# Patient Record
Sex: FEMALE | Race: WHITE | NOT HISPANIC OR LATINO | Employment: FULL TIME | URBAN - METROPOLITAN AREA
[De-identification: names, ages, dates, MRNs, and addresses within clinical notes are randomized per-mention and may not be internally consistent; named-entity substitution may affect disease eponyms.]

---

## 2018-01-15 ENCOUNTER — ALLSCRIPTS OFFICE VISIT (OUTPATIENT)
Dept: OTHER | Facility: OTHER | Age: 53
End: 2018-01-15

## 2018-01-15 DIAGNOSIS — Z12.31 ENCOUNTER FOR SCREENING MAMMOGRAM FOR MALIGNANT NEOPLASM OF BREAST: ICD-10-CM

## 2018-01-16 NOTE — PROGRESS NOTES
Assessment   1  Encounter for preventive health examination (V70 0) (Z00 00)   2  Hypothyroid (244 9) (E03 9)   3  Depression (311) (F32 9)   4  Former smoker (V15 82) (R84 369)   5  BMI 30 0-30 9,adult (V85 30) (Z68 30)    Plan    BMI 30 0-30 9,adult, PMH: History of herpes zoster    · Begin a limited exercise program ; Status:Complete;   Done: 49NRB8319   · Some eating tips that can help you lose weight ; Status:Complete;   Done: 42EME6056  Depression    · Sertraline HCl - 50 MG Oral Tablet   · From  Sertraline HCl - 100 MG Oral Tablet TAKE 1 TABLET DAILY To Sertraline    HCl - 100 MG Oral Tablet TAKE 1 AND 1/2 TABLETS EVERY DAY  Encounter for screening colonoscopy    · COLONOSCOPY; Status:Active; Requested ARK:[id number]; Health Maintenance    · Brush your teeth {freq1} and floss at least once a day ; Status:Complete;   Done:    89KFA3406   · Drink plenty of fluids ; Status:Complete;   Done: 39QJR8673   · Eat foods that are high in calcium ; Status:Complete;   Done: 36XYK6722   · We encourage all of our patients to exercise regularly  30 minutes of exercise or physical    activity five or more days a week is recommended for children and adults ;    Status:Complete;   Done: 82YTN7829   · We recommend routine visits to a dentist ; Status:Complete;   Done: 98CAG7183   · We recommend that you follow the [address] ; Status:Complete;   Done:    50LDV7784   · Call [phone] if: You find a new or different kind of lump in your breast ;    Status:Complete;   Done: 00GMA4795   · Call [phone] if: You have any bleeding from the vagina ; Status:Complete;   Done:    95YWM4340   · Call [phone] if: You have any warning signs of skin cancer ; Status:Complete;      Done: 89NYY0478   · Call 911 if: You experience a new kind of chest pain (angina) or pressure ;    Status:Complete;   Done: 55RIC4762  Hypothyroid    · Levothyroxine Sodium 50 MCG Oral Tablet (Synthroid);  Take 1 tablet daily  Screening for depression    · *VB-Depression Screening; Status:Complete;   Done: 83SXO1002 02:18PM   · *VB-Depression Screening ; every 1 year; Last 63DAW8013; Next [id number];    Status:Active      * MAMMO SCREENING BILATERAL W CAD; Status:Hold For - Scheduling; Requested for:15Jan2018; Perform:[facility]; IHU:[id number];PGSNLME;       Ezequiel Edwards for screening for malignant neoplasm of breast; Ordered By:Sd Rojas;        Discussion/Summary   Discussion Summary:    1) SCHEDULE COLONOSCOPY: ROUTINE SCREEN COLON CANCER SCHEDULE MAMMOGRAM : ROUTINE SCREE FOR BREAST CANCER DEPRESSION/ANXIETY: CONTINUE SERTRALINE 150MG DAILY  HYPOTHRYOID: CONTINUE LEVOTHYROXINE  VITAMIN D DEFICIENCY:  CHECK ON LAST Tdap  schedule with general surgeon for evaluation of cyst on back  Medication SE Review and Pt Understands Tx: Possible side effects of new medications were reviewed with the patient/guardian today  The treatment plan was reviewed with the patient/guardian  The patient/guardian understands and agrees with the treatment plan      Chief Complaint   Chief Complaint Free Text Note Form: PT HERE FOR HER INITIAL VISIT FOR A YEARL PE  History of Present Illness   HPI: PATIENT IS HERE FOR HER INITIAL VISIT IN OUR OFFICE  SHE MOVED FROM [place] IN OCTOBER  SHE IS GENERALLY HEALTHY  Luis Goody PATIENT HAS PROBLEM WITH ANXIETY WHICH ZOLOFT IS HELPFUL FOR  SHE TAKES 150 MG DAILY  SHE HAS ALSO DIAGNOSIS OF HYPOTHYROIDISM AND IS ON THYROID SUPPLEMENT 50 MCG DAILY  THIS IS BEEN OVER THE PAST 6 MONTHS AND HER DOSAGE WAS ADJUSTED AND LEVEL WITH HER LAST BLOOD WORK  PATIENT IS UNSURE OF HER LAST TETANUS      Review of Systems   Complete-Female:      Constitutional: No fever, no chills, feels well, no tiredness, no recent weight gain or weight loss  Eyes: No complaints of eye pain, no red eyes, no eyesight problems, no discharge, no dry eyes, no itching of eyes        ENT: no complaints of earache, no loss of hearing, no nose bleeds, no nasal discharge, no sore throat, no hoarseness  Cardiovascular: No complaints of slow heart rate, no fast heart rate, no chest pain, no palpitations, no leg claudication, no lower extremity edema  Respiratory: No complaints of shortness of breath, no wheezing, no cough, no SOB on exertion, no orthopnea, no PND  Gastrointestinal: No complaints of abdominal pain, no constipation, no nausea or vomiting, no diarrhea, no bloody stools  Genitourinary: No complaints of dysuria, no incontinence, no pelvic pain, no dysmenorrhea, no vaginal discharge or bleeding  Musculoskeletal: No complaints of arthralgias, no myalgias, no joint swelling or stiffness, no limb pain or swelling  Integumentary: No complaints of skin rash or lesions, no itching, no skin wounds, no breast pain or lump  Neurological: No complaints of headache, no confusion, no convulsions, no numbness, no dizziness or fainting, no tingling, no limb weakness, no difficulty walking  Psychiatric: anxiety, but-- as noted in HPI,-- not suicidal,-- no personality change,-- no sleep disturbances-- and-- no depression  Endocrine: No complaints of proptosis, no hot flashes, no muscle weakness, no deepening of the voice, no feelings of weakness  Hematologic/Lymphatic: No complaints of swollen glands, no swollen glands in the neck, does not bleed easily, does not bruise easily  Active Problems   1  Depression (311) (F32 9)   2  Encounter for screening colonoscopy (V76 51) (Z12 11)   3  Encounter for screening for malignant neoplasm of breast (V76 10) (Z12 31)   4  Hypothyroid (244 9) (E03 9)   5  Screening for depression (V79 0) (Z13 89)    Past Medical History   1  History of Fracture of middle phalanx of finger of left hand (816 01) (A35 337P)   2  History of herpes zoster (V12 09) (Z86 19)  Active Problems And Past Medical History Reviewed:     The active problems and past medical history were reviewed and updated today       Surgical History   1  History of Adenoidectomy   2  History of Lymphadenectomy  Surgical History Reviewed: The surgical history was reviewed and updated today  Family History   Mother    1  Family history of osteoporosis (V17 81) (Z82 62)   2  Family history of thyroid disease (V18 19) (Z83 49)   3  Family history of Restless leg  Father    4  Family history of malignant neoplasm of prostate (V16 42) (Z80 42)  Brother    5  Family history of thyroid disease (V18 19) (Z83 49)  Paternal Grandmother    10  Family history of Alzheimer's disease (V17 2) (Z82 0)  Paternal Relatives    7  Family history of schizophrenia (V17 0) (Z81 8)  Family History Reviewed: The family history was reviewed and updated today  Social History    · Alcohol ingestion, 1-4 drinks/week (V69 8) (Z78 9)   · Daily caffeine consumption, 1 serving a day   · Exercises daily (V49 89) (Z78 9)   · Former smoker (H62 93) (V11 881)  Social History Reviewed: The social history was reviewed and updated today  The social history was reviewed and is unchanged  Current Meds    1  Sertraline HCl - 100 MG Oral Tablet; Therapy: (Recorded:15Jan2018) to Recorded   2  Sertraline HCl - 50 MG Oral Tablet; Therapy: (Recorded:15Jan2018) to Recorded   3  Synthroid 50 MCG Oral Tablet; Therapy: (Recorded:15Jan2018) to Recorded   4  Vitamin D3 5000 UNIT Oral Capsule; Therapy: (Recorded:15Jan2018) to Recorded    Allergies   1  Sulfa Drugs    Vitals   Vital Signs    Recorded: 62XMW4937 02:08PM   Temperature 97 6 F   Heart Rate 66   Systolic 148   Diastolic 78   Height 5 ft 7 in   Weight 195 lb 12 oz   BMI Calculated 30 66   BSA Calculated 2   O2 Saturation 98     Physical Exam        Constitutional      General appearance: No acute distress, well appearing and well nourished  Eyes      Conjunctiva and lids: No swelling, erythema or discharge  Pupils and irises: Equal, round and reactive to light         Ears, Nose, Mouth, and Throat      External inspection of ears and nose: Normal        Otoscopic examination: Tympanic membranes translucent with normal light reflex  Canals patent without erythema  Nasal mucosa, septum, and turbinates: Normal without edema or erythema  Oropharynx: Normal with no erythema, edema, exudate or lesions  Pulmonary      Respiratory effort: No increased work of breathing or signs of respiratory distress  Auscultation of lungs: Clear to auscultation  Cardiovascular      Auscultation of heart: Normal rate and rhythm, normal S1 and S2, without murmurs  Examination of extremities for edema and/or varicosities: Normal        Abdomen      Abdomen: Non-tender, no masses  Liver and spleen: No hepatomegaly or splenomegaly  Skin      Skin and subcutaneous tissue: Normal without rashes or lesions  Psychiatric      Orientation to person, place, and time: Normal        Mood and affect: Normal           Results/Data   *VB-Depression Screening 23ZRZ3058 02:18PM [employer]      Test Name Result Flag Reference   Depression Scale Result      Depression Screen - Negative For Symptoms      PHQ-2 Adult Depression Screening 13QEU1676 02:17PM User, [facility]      Test Name Result Flag Reference   PHQ-2 Adult Depression Score 0     Over the last two weeks, how often have you been bothered by any of the following problems?      Little interest or pleasure in doing things: Not at all - 0     Feeling down, depressed, or hopeless: Not at all - 0   PHQ-2 Adult Depression Screening Negative          Signatures    Electronically signed by : Corinne Crowley DO; Mac 15 2018 10:54PM EST                       (Author)

## 2018-01-22 VITALS
DIASTOLIC BLOOD PRESSURE: 78 MMHG | WEIGHT: 195.75 LBS | SYSTOLIC BLOOD PRESSURE: 124 MMHG | TEMPERATURE: 97.6 F | OXYGEN SATURATION: 98 % | HEART RATE: 66 BPM | BODY MASS INDEX: 30.72 KG/M2 | HEIGHT: 67 IN

## 2018-01-25 ENCOUNTER — HOSPITAL ENCOUNTER (OUTPATIENT)
Dept: MAMMOGRAPHY | Facility: CLINIC | Age: 53
Discharge: HOME | End: 2018-01-25
Payer: COMMERCIAL

## 2018-01-25 DIAGNOSIS — Z12.31 ENCOUNTER FOR SCREENING MAMMOGRAM FOR MALIGNANT NEOPLASM OF BREAST: ICD-10-CM

## 2018-01-25 PROCEDURE — 77067 SCR MAMMO BI INCL CAD: CPT

## 2018-02-14 DIAGNOSIS — E03.9 ACQUIRED HYPOTHYROIDISM: Primary | ICD-10-CM

## 2018-02-14 DIAGNOSIS — F32.0 MILD MAJOR DEPRESSION (HCC): ICD-10-CM

## 2018-02-14 RX ORDER — SERTRALINE HYDROCHLORIDE 100 MG/1
1.5 TABLET, FILM COATED ORAL DAILY
COMMUNITY
End: 2018-02-14 | Stop reason: SDUPTHER

## 2018-02-14 RX ORDER — LEVOTHYROXINE SODIUM 0.05 MG/1
1 TABLET ORAL DAILY
COMMUNITY
End: 2018-02-14 | Stop reason: SDUPTHER

## 2018-02-15 PROBLEM — F32.A DEPRESSION: Status: ACTIVE | Noted: 2018-01-15

## 2018-02-15 PROBLEM — E03.9 HYPOTHYROID: Status: ACTIVE | Noted: 2018-01-15

## 2018-02-15 RX ORDER — SERTRALINE HYDROCHLORIDE 100 MG/1
150 TABLET, FILM COATED ORAL DAILY
Qty: 1.5 TABLET | Refills: 0 | Status: SHIPPED | OUTPATIENT
Start: 2018-02-15 | End: 2018-02-19 | Stop reason: SDUPTHER

## 2018-02-15 RX ORDER — LEVOTHYROXINE SODIUM 0.05 MG/1
50 TABLET ORAL DAILY
Qty: 30 TABLET | Refills: 2 | Status: SHIPPED | OUTPATIENT
Start: 2018-02-15 | End: 2018-05-23 | Stop reason: DRUGHIGH

## 2018-02-15 RX ORDER — MAG HYDROX/ALUMINUM HYD/SIMETH 400-400-40
SUSPENSION, ORAL (FINAL DOSE FORM) ORAL
COMMUNITY
End: 2019-01-04 | Stop reason: ALTCHOICE

## 2018-02-19 DIAGNOSIS — F32.0 MILD MAJOR DEPRESSION (HCC): ICD-10-CM

## 2018-02-19 RX ORDER — SERTRALINE HYDROCHLORIDE 100 MG/1
150 TABLET, FILM COATED ORAL DAILY
Qty: 135 TABLET | Refills: 3 | Status: SHIPPED | OUTPATIENT
Start: 2018-02-19 | End: 2018-02-23 | Stop reason: SDUPTHER

## 2018-02-19 RX ORDER — SERTRALINE HYDROCHLORIDE 100 MG/1
150 TABLET, FILM COATED ORAL DAILY
Qty: 135 TABLET | Refills: 3 | Status: SHIPPED | OUTPATIENT
Start: 2018-02-19 | End: 2018-02-19 | Stop reason: SDUPTHER

## 2018-02-23 DIAGNOSIS — F32.0 MILD MAJOR DEPRESSION (HCC): ICD-10-CM

## 2018-02-23 RX ORDER — SERTRALINE HYDROCHLORIDE 100 MG/1
150 TABLET, FILM COATED ORAL DAILY
Qty: 135 TABLET | Refills: 0 | Status: SHIPPED | OUTPATIENT
Start: 2018-02-23 | End: 2019-05-17 | Stop reason: SDUPTHER

## 2018-04-11 ENCOUNTER — TELEPHONE (OUTPATIENT)
Dept: GASTROENTEROLOGY | Facility: CLINIC | Age: 53
End: 2018-04-11

## 2018-04-11 NOTE — TELEPHONE ENCOUNTER
Zeina Covington  1965  [address]  Cell Phone     Screened by: Mel Alcantara      Referring Dr : Dr Flaco Valdez:   Has patient been referred for a routine screening Colonoscopy? yes  Is the patient over 48years of age? yes    If the answer is YES to both questions, proceed to the medical questions  Do you have any of the following symptoms? Have you had a coronary or vascular stent within the last year? no    Have you had a heart attack or stroke in the last 6 months? no    Have you had intestinal surgery in the last 3 months? no    Do you have problems with:    Do you use:  Oxygen no  CPAP/BiPAP no    Have you been hospitalized in the last Month? no    Have you been diagnosed with a bleeding disorder or anemia? no    Have you had chest pain (angina) or breathing problems  (COPD) in the last 3 months? no     Do you have any difficulty walking up a flight of stairs? no    Have you had Kidney failure or insufficiency? no    Have you had heart valve surgery? no    Are you confined to a wheelchair? no    Do you take     Do you take insulin for Diabetes no  Name of medication:    : If patient answers NO to medical questions, then schedule procedure  If patient answers YES to medical questions, then schedule office appointment  Previous Colonoscopy no   (if yes) Date and Facility of last colonoscopy? Patient scheduled for procedure:   Scheduled by:     Time:   Provider:   Location:     Insurance:   Referral Required? Were instructions Mailed? Were instructions sent to [facility]:   Was the prep sent to Pharmacy?      Comments: any provider, eliazar

## 2018-04-20 ENCOUNTER — PREP FOR PROCEDURE (OUTPATIENT)
Dept: GASTROENTEROLOGY | Facility: AMBULARY SURGERY CENTER | Age: 53
End: 2018-04-20

## 2018-04-20 DIAGNOSIS — Z12.11 SCREENING FOR COLON CANCER: Primary | ICD-10-CM

## 2018-05-08 ENCOUNTER — ANESTHESIA EVENT (OUTPATIENT)
Dept: PERIOP | Facility: AMBULARY SURGERY CENTER | Age: 53
End: 2018-05-08
Payer: COMMERCIAL

## 2018-05-15 ENCOUNTER — OFFICE VISIT (OUTPATIENT)
Dept: FAMILY MEDICINE CLINIC | Facility: CLINIC | Age: 53
End: 2018-05-15
Payer: COMMERCIAL

## 2018-05-15 VITALS
HEIGHT: 72 IN | WEIGHT: 195 LBS | BODY MASS INDEX: 26.41 KG/M2 | TEMPERATURE: 98.4 F | DIASTOLIC BLOOD PRESSURE: 64 MMHG | HEART RATE: 61 BPM | OXYGEN SATURATION: 97 % | SYSTOLIC BLOOD PRESSURE: 100 MMHG

## 2018-05-15 DIAGNOSIS — E03.9 ACQUIRED HYPOTHYROIDISM: Primary | ICD-10-CM

## 2018-05-15 DIAGNOSIS — R53.83 OTHER FATIGUE: ICD-10-CM

## 2018-05-15 PROCEDURE — 99213 OFFICE O/P EST LOW 20 MIN: CPT | Performed by: FAMILY MEDICINE

## 2018-05-15 PROCEDURE — 3008F BODY MASS INDEX DOCD: CPT | Performed by: FAMILY MEDICINE

## 2018-05-15 NOTE — PROGRESS NOTES
Assessment/Plan:      Diagnoses and all orders for this visit:    Acquired hypothyroidism  -     TSH, 3rd generation; Future  -     T3, free; Future  -     T4, free; Future  -     TSH, 3rd generation  -     T3, free  -     T4, free    Other fatigue  -     TSH, 3rd generation; Future  -     T3, free; Future  -     T4, free; Future  -     TSH, 3rd generation  -     T3, free  -     T4, free        Fatigue:  Patient will get thyroid testing done and may need change in dosage of thyroid medication  , currently on 50 mcg daily  Subjective:     Patient ID: Adeola Greer is a 47 y o  female  Patient has been feeling tired for the past 3-4 weeks  She is currently on levothyroxine 50 mcg daily  She does take in the morning on an empty stomach with a glass of water and does not eat for 30 min after  She has been working out on the Treadmill 6 days a week, for about 3 miles  Eating healthy  Some shortness of breath, dizzy spells  Feels fatigued constantly  Patient getting adequate sleep at about 6-7 hours per night  She feels that is a restful sleep but wakes up with fatigue  She denies any chest pains or shortness of breath  She denies tick bites or rashes  Review of Systems   Constitutional: Positive for fatigue  Negative for fever  Trouble losing weight   HENT: Negative  Eyes: Negative  Respiratory: Negative  Negative for cough  Cardiovascular: Negative  Gastrointestinal: Negative  Endocrine: Negative  Genitourinary: Negative  Musculoskeletal: Negative  Skin: Negative  Allergic/Immunologic: Negative  Neurological: Negative  Psychiatric/Behavioral: Negative            The following portions of the patient's history were reviewed and updated as appropriate: allergies, current medications, past family history, past medical history, past social history, past surgical history and problem list     Objective:  Vitals:    05/15/18 1730   BP: 100/64   Pulse: 61   Temp: 98 4 °F (36 9 °C)   SpO2: 97%   Weight: 88 5 kg (195 lb)   Height: 7' 7 5" (2 324 m)      Physical Exam   Constitutional: She is oriented to person, place, and time  She appears well-developed and well-nourished  HENT:   Head: Normocephalic and atraumatic  Cardiovascular: Normal rate, regular rhythm and normal heart sounds  Pulmonary/Chest: Effort normal and breath sounds normal    Abdominal: Soft  Bowel sounds are normal    Neurological: She is alert and oriented to person, place, and time  Skin: Skin is warm and dry  Psychiatric: She has a normal mood and affect  Her behavior is normal  Judgment and thought content normal    Nursing note and vitals reviewed

## 2018-05-16 NOTE — PATIENT INSTRUCTIONS
Non fasting blood work at [employer] to check for thyroid levels  Continue with activity  Continue levothyroxine at 50 mcg daily  , may need adjustment in medication

## 2018-05-21 ENCOUNTER — HOSPITAL ENCOUNTER (OUTPATIENT)
Facility: AMBULARY SURGERY CENTER | Age: 53
Setting detail: OUTPATIENT SURGERY
Discharge: HOME | End: 2018-05-21
Attending: INTERNAL MEDICINE | Admitting: INTERNAL MEDICINE
Payer: COMMERCIAL

## 2018-05-21 ENCOUNTER — ANESTHESIA (OUTPATIENT)
Dept: PERIOP | Facility: AMBULARY SURGERY CENTER | Age: 53
End: 2018-05-21
Payer: COMMERCIAL

## 2018-05-21 VITALS
BODY MASS INDEX: 29.66 KG/M2 | SYSTOLIC BLOOD PRESSURE: 110 MMHG | RESPIRATION RATE: 16 BRPM | HEIGHT: 67 IN | OXYGEN SATURATION: 99 % | HEART RATE: 48 BPM | DIASTOLIC BLOOD PRESSURE: 66 MMHG | WEIGHT: 189 LBS | TEMPERATURE: 97.2 F

## 2018-05-21 LAB — EXT PREGNANCY TEST URINE: NEGATIVE

## 2018-05-21 PROCEDURE — G0121 COLON CA SCRN NOT HI RSK IND: HCPCS | Performed by: INTERNAL MEDICINE

## 2018-05-21 PROCEDURE — 81025 URINE PREGNANCY TEST: CPT | Performed by: ANESTHESIOLOGY

## 2018-05-21 RX ORDER — LIDOCAINE HYDROCHLORIDE 10 MG/ML
INJECTION, SOLUTION INFILTRATION; PERINEURAL AS NEEDED
Status: DISCONTINUED | OUTPATIENT
Start: 2018-05-21 | End: 2018-05-21 | Stop reason: SURG

## 2018-05-21 RX ORDER — SODIUM CHLORIDE 9 MG/ML
50 INJECTION, SOLUTION INTRAVENOUS CONTINUOUS
Status: DISCONTINUED | OUTPATIENT
Start: 2018-05-21 | End: 2018-05-21 | Stop reason: HOSPADM

## 2018-05-21 RX ORDER — PROPOFOL 10 MG/ML
INJECTION, EMULSION INTRAVENOUS AS NEEDED
Status: DISCONTINUED | OUTPATIENT
Start: 2018-05-21 | End: 2018-05-21 | Stop reason: SURG

## 2018-05-21 RX ORDER — PROPOFOL 10 MG/ML
INJECTION, EMULSION INTRAVENOUS CONTINUOUS PRN
Status: DISCONTINUED | OUTPATIENT
Start: 2018-05-21 | End: 2018-05-21 | Stop reason: SURG

## 2018-05-21 RX ADMIN — PROPOFOL 120 MCG/KG/MIN: 10 INJECTION, EMULSION INTRAVENOUS at 11:42

## 2018-05-21 RX ADMIN — SODIUM CHLORIDE: 0.9 INJECTION, SOLUTION INTRAVENOUS at 11:36

## 2018-05-21 RX ADMIN — PROPOFOL 100 MG: 10 INJECTION, EMULSION INTRAVENOUS at 11:42

## 2018-05-21 RX ADMIN — LIDOCAINE HYDROCHLORIDE 50 MG: 10 INJECTION, SOLUTION INFILTRATION; PERINEURAL at 11:42

## 2018-05-21 NOTE — ANESTHESIA POSTPROCEDURE EVALUATION
Post-Op Assessment Note      CV Status:  Stable    Mental Status:  Alert and awake    Hydration Status:  Euvolemic    PONV Controlled:  Controlled    Airway Patency:  Patent    Post Op Vitals Reviewed: Yes          Staff: CRNA           BP   103/65   Temp     Pulse 52   Resp 18   SpO2   99

## 2018-05-21 NOTE — OP NOTE
COLONOSCOPY    PROCEDURE: Colonoscopy    INDICATIONS: Screening for Colon Cancer    POST-OP DIAGNOSIS: See the impression below    SEDATION: Monitored anesthesia care, check anesthesia records    PHYSICAL EXAM:    /65   Pulse (!) 48   Temp (!) 96 6 °F (35 9 °C) (Temporal)   Resp 16   Ht 5' 7" (1 702 m)   Wt 85 7 kg (189 lb)   LMP 05/21/2018 (Exact Date)   SpO2 99%   BMI 29 60 kg/m²   Body mass index is 29 6 kg/m²  General: NAD  Heart: S1 & S2 normal, RRR  Lungs: CTA, No rales or rhonchi  Abdomen: Soft, nontender, nondistended, good bowel sounds    CONSENT:  Informed consent was obtained for the procedure, including sedation after explaining the risks and benefits of the procedure  Risks including but not limited to bleeding, perforation, infection, aspiration were discussed in detail  Also explained about less than 100%$ sensitivity with the exam and other alternatives  PREPARATION:   EKG tracing, pulse oximetry, blood pressure were monitored throughout the procedure  Patient was identified by myself both verbally and by visual inspection of ID band  DESCRIPTION:   Patient was placed in the left lateral decubitus position and was sedated with the above medication  Digital rectal examination was performed  The colonoscope was introduced in to the anal canal and advanced up to cecum, which was identified by the appendiceal orifice and IC valve  A careful inspection was made as the colonoscope was withdrawn, including a retroflexed view of the rectum; findings and interventions are described below  Appropriate photodocumentation was obtained  The quality of the colonic preparation was adequate  FINDINGS:    1  Cecum and ileocecal valve-normal mucosa    2  The rest of the colon mucosa is normal         IMPRESSIONS:      As above    RECOMMENDATIONS:    Next colonoscopy in 10 years    COMPLICATIONS:  None; patient tolerated the procedure well      DISPOSITION: PACU           CONDITION: Stable

## 2018-05-21 NOTE — ANESTHESIA PREPROCEDURE EVALUATION
Review of Systems/Medical History    Chart reviewed  History of anesthetic complications PONV    Cardiovascular  Negative cardio ROS Exercise tolerance (METS): >4,     Pulmonary    Comment: Quit smoking 7 years ago     GI/Hepatic  Negative GI/hepatic ROS          Negative  ROS        Endo/Other  History of thyroid disease , hypothyroidism,      GYN      Comment: (-) HCG DOS     Hematology  Negative hematology ROS      Musculoskeletal  Negative musculoskeletal ROS        Neurology  Negative neurology ROS      Psychology   Depression ,              Physical Exam    Airway    Mallampati score: I  TM Distance: >3 FB  Neck ROM: full     Dental   No notable dental hx     Cardiovascular  Comment: Negative ROS,     Pulmonary      Other Findings        Anesthesia Plan  ASA Score- 2     Anesthesia Type- IV sedation with anesthesia with ASA Monitors  Additional Monitors:   Airway Plan:     Comment: GA backup  Plan Factors-    Induction- intravenous  Postoperative Plan-     Informed Consent- Anesthetic plan and risks discussed with patient  I personally reviewed this patient with the CRNA  Discussed and agreed on the Anesthesia Plan with the JM Martin

## 2018-05-21 NOTE — H&P
History and Physical - [facility]  Rafat Payne 47 y o  female MRN: [id number]        HPI:  60-year-old female was referred for screening colonoscopy  Patient has regular bowel movements and denies any blood or mucus in the stool  Appetite is good and denies any recent weight loss  Denies any abdominal pain, nausea, or vomiting  Has no heartburn or acid reflux  Denies any difficulty swallowing  Historical Information   No past medical history on file  Past Surgical History:   Procedure Laterality Date    ADENOIDECTOMY      Last assessed: 1/15/18    LYMPHADENECTOMY      Neck, Last assessed: 1/15/18     Social History   History   Alcohol Use    Yes     Comment: 1-4 drinks/ week, per allscripts     History   Drug use: Unknown     History   Smoking Status    Former Smoker   Smokeless Tobacco    Never Used     Family History   Problem Relation Age of Onset    Osteoporosis Mother     Thyroid disease Mother     Restless legs syndrome Mother     Prostate cancer Father     Thyroid disease Brother     Alzheimer's disease Paternal Grandmother     Schizophrenia Other        Meds/Allergies     Prescriptions Prior to Admission   Medication    Cholecalciferol (VITAMIN D3) 5000 units CAPS    levothyroxine 50 mcg tablet    sertraline (ZOLOFT) 100 mg tablet       Allergies   Allergen Reactions    Sulfa Antibiotics Throat Swelling       Objective     There were no vitals taken for this visit      PHYSICAL EXAM:    Gen: NAD  CV: S1 & S2 normal, RRR  CHEST: Clear to auscultate  ABD: soft, NT/ND, good bowel sounds  EXT: no edema    ASSESSMENT:     Screening for colon cancer    PLAN:    Colonoscopy

## 2018-05-22 LAB
T3FREE SERPL-MCNC: 1.9 PG/ML (ref 2–4.4)
T4 FREE SERPL-MCNC: 1.03 NG/DL (ref 0.82–1.77)
TSH SERPL DL<=0.005 MIU/L-ACNC: 3.64 UIU/ML (ref 0.45–4.5)

## 2018-05-23 DIAGNOSIS — E03.9 ACQUIRED HYPOTHYROIDISM: Primary | ICD-10-CM

## 2018-05-23 RX ORDER — LEVOTHYROXINE SODIUM 0.07 MG/1
75 TABLET ORAL DAILY
Qty: 30 TABLET | Refills: 1 | Status: SHIPPED | OUTPATIENT
Start: 2018-05-23 | End: 2018-06-19 | Stop reason: SDUPTHER

## 2018-06-19 DIAGNOSIS — E03.9 ACQUIRED HYPOTHYROIDISM: ICD-10-CM

## 2018-06-20 RX ORDER — LEVOTHYROXINE SODIUM 0.07 MG/1
75 TABLET ORAL DAILY
Qty: 90 TABLET | Refills: 0 | Status: SHIPPED | OUTPATIENT
Start: 2018-06-20 | End: 2019-01-04 | Stop reason: ALTCHOICE

## 2018-07-15 LAB
T3FREE SERPL-MCNC: 1.9 PG/ML (ref 2–4.4)
T4 FREE SERPL-MCNC: 1.16 NG/DL (ref 0.82–1.77)
TSH SERPL DL<=0.005 MIU/L-ACNC: 2.97 UIU/ML (ref 0.45–4.5)

## 2018-09-22 DIAGNOSIS — E03.9 ACQUIRED HYPOTHYROIDISM: ICD-10-CM

## 2018-09-22 RX ORDER — LEVOTHYROXINE SODIUM 0.07 MG/1
TABLET ORAL
Qty: 90 TABLET | Refills: 2 | Status: SHIPPED | OUTPATIENT
Start: 2018-09-22

## 2019-01-04 ENCOUNTER — OFFICE VISIT (OUTPATIENT)
Dept: FAMILY MEDICINE CLINIC | Facility: CLINIC | Age: 54
End: 2019-01-04
Payer: COMMERCIAL

## 2019-01-04 VITALS
OXYGEN SATURATION: 99 % | DIASTOLIC BLOOD PRESSURE: 78 MMHG | TEMPERATURE: 98.1 F | WEIGHT: 201.5 LBS | HEART RATE: 62 BPM | SYSTOLIC BLOOD PRESSURE: 122 MMHG | BODY MASS INDEX: 31.63 KG/M2 | HEIGHT: 67 IN

## 2019-01-04 DIAGNOSIS — Z12.39 SCREENING FOR BREAST CANCER: ICD-10-CM

## 2019-01-04 DIAGNOSIS — J01.00 ACUTE NON-RECURRENT MAXILLARY SINUSITIS: Primary | ICD-10-CM

## 2019-01-04 PROCEDURE — 99214 OFFICE O/P EST MOD 30 MIN: CPT | Performed by: FAMILY MEDICINE

## 2019-01-04 RX ORDER — DOXYCYCLINE HYCLATE 100 MG/1
100 CAPSULE ORAL EVERY 12 HOURS SCHEDULED
Qty: 20 CAPSULE | Refills: 0 | Status: SHIPPED | OUTPATIENT
Start: 2019-01-04 | End: 2019-01-14

## 2019-01-04 NOTE — PROGRESS NOTES
Subjective:   Chief Complaint   Patient presents with    URI     PT WAS SEEN ON 12/23 AT URGENT CARE FOR SINUS AND EAR INFECTION  PT FINISHED MEDICATION 3 DAYS AGO  PT CONTINUES WITH EAR PAIN, SINUS PRESSURE, HEADACHE, CONGESTION AND SCALP SENSITIVITY  Patient ID: Micky Santa is a 47 y o  female  The pt is here because SHE has been sick for 2 weeks now  She was seen in urgent care in [address] [place] [address] on 12/23/18  She was treated for a sinus and ear infection with amoxicillin 875mg twice a day for a week  It did not really help much   + sinus pain/pressure  + ear pain and pressure  + cough  + nasal congestion  + headaches  + PND  + sore throat  No fevers          The following portions of the patient's history were reviewed and updated as appropriate: allergies, current medications, past family history, past medical history, past social history, past surgical history and problem list     Review of Systems      Objective:  Vitals:    01/04/19 1342   BP: 122/78   Pulse: 62   Temp: 98 1 °F (36 7 °C)   SpO2: 99%   Weight: 91 4 kg (201 lb 8 oz)   Height: 5' 7" (1 702 m)      Physical Exam   Constitutional: She is oriented to person, place, and time  Vital signs are normal  She appears well-developed and well-nourished  She appears ill  HENT:   Head: Normocephalic and atraumatic  Right Ear: External ear normal  Tympanic membrane is erythematous and bulging  Left Ear: External ear normal  Tympanic membrane is erythematous and bulging  Nose: Mucosal edema present  No rhinorrhea or sinus tenderness  Right sinus exhibits maxillary sinus tenderness  Right sinus exhibits no frontal sinus tenderness  Left sinus exhibits maxillary sinus tenderness  Left sinus exhibits no frontal sinus tenderness  Mouth/Throat: Mucous membranes are normal  Posterior oropharyngeal erythema present  No oropharyngeal exudate, posterior oropharyngeal edema or tonsillar abscesses     Eyes: Conjunctivae and lids are normal    Pulmonary/Chest: Effort normal and breath sounds normal    Lymphadenopathy:     She has cervical adenopathy  Neurological: She is alert and oriented to person, place, and time  Skin: Skin is warm, dry and intact  Psychiatric: She has a normal mood and affect  Thought content normal          Assessment/Plan:    No problem-specific Assessment & Plan notes found for this encounter  Diagnoses and all orders for this visit:    Acute non-recurrent maxillary sinusitis  -     doxycycline hyclate (VIBRAMYCIN) 100 mg capsule; Take 1 capsule (100 mg total) by mouth every 12 (twelve) hours for 10 days    I suspect that the patient has a bacterial sinusitis and we discussed that amoxicillin often does not treat sinusitis and we discussed that amoxicillin does not treat sinusitis much of the time  I prescribed a different antibiotic and encouraged medication for sx relief  Rest and fluids encouraged as well  If she is not feeling better within 4-5 days she should let me know and I will add prednisone      Screening for breast cancer  -     Mammo screening bilateral w 3d & cad; Future

## 2019-01-04 NOTE — PATIENT INSTRUCTIONS
Rhinosinusitis   WHAT YOU NEED TO KNOW:   Rhinosinusitis (RS) is inflammation of your nose and sinuses  It commonly begins as a virus, often as a common cold  Viruses usually last 7 to 10 days and do not need treatment  When the virus does not get better on its own, you may have bacterial RS  This means that bacteria have begun to grow inside your sinuses  Acute RS lasts less than 4 weeks  Chronic RS lasts 12 weeks or more  Recurrent RS is when you have 4 or more episodes of RS in one year  DISCHARGE INSTRUCTIONS:   Return to the emergency department if:   · Your eye and eyelid are red, swollen, and painful  · You cannot open your eye  · You have double vision or you cannot see  · Your eyeball bulges out or you cannot move your eye  · You are more sleepy than normal or you notice changes in your ability to think, move, or talk  · You have a stiff neck, a fever, or a bad headache  · You have swelling of your forehead or scalp  Contact your healthcare provider if:   · Your symptoms are worse or do not improve after 3 to 5 days of treatment  · You have questions or concerns about your condition or care  Medicines: You may need any of the following:  · Acetaminophen  decreases pain and fever  It is available without a doctor's order  Ask how much to take and how often to take it  Follow directions  Acetaminophen can cause liver damage if not taken correctly  · NSAIDs , such as ibuprofen, help decrease swelling, pain, and fever  This medicine is available with or without a doctor's order  NSAIDs can cause stomach bleeding or kidney problems in certain people  If you take blood thinner medicine, always ask your healthcare provider if NSAIDs are safe for you  Always read the medicine label and follow directions  · Nasal steroid sprays  decrease inflammation in your nose and sinuses  · Decongestants  reduce swelling and drain mucus in the nose and sinuses   They may help you breathe easier  · Antihistamines  dry mucus in the nose and relieve sneezing  · Antibiotics  treat a bacterial infection and may be needed if your symptoms do not improve or they get worse  · Take your medicine as directed  Contact your healthcare provider if you think your medicine is not helping or if you have side effects  Tell him or her if you are allergic to any medicine  Keep a list of the medicines, vitamins, and herbs you take  Include the amounts, and when and why you take them  Bring the list or the pill bottles to follow-up visits  Carry your medicine list with you in case of an emergency  Self-care:   · Rinse your sinuses  Use a sinus rinse device to rinse your nasal passages with a saline (salt water) solution  This will help thin the mucus in your nose and rinse away pollen and dirt  It will also help reduce swelling so you can breathe normally  Ask your healthcare provider how often to do this  · Breathe in steam   Heat a bowl of water until you see steam  Lean over the bowl and make a tent over your head with a large towel  Breathe deeply for about 20 minutes  Be careful not to get too close to the steam or burn yourself  Do this 3 times a day  You can also breathe deeply when you take a hot shower  · Sleep with your head elevated  Place an extra pillow under your head before you go to sleep to help your sinuses drain  · Drink liquids as directed  Ask your healthcare provider how much liquid to drink each day and which liquids are best for you  Liquids will thin the mucus in your nose and help it drain  Avoid drinks that contain alcohol or caffeine  · Do not smoke, and avoid secondhand smoke  Nicotine and other chemicals in cigarettes and cigars can make your symptoms worse  Ask your healthcare provider for information if you currently smoke and need help to quit  E-cigarettes or smokeless tobacco still contain nicotine   Talk to your healthcare provider before you use these products  Follow up with your healthcare provider as directed: Follow up if your symptoms are worse or not better after 3 to 5 days of treatment  Write down your questions so you remember to ask them during your visits  © 2017 [address] [employer] Information is for End User's use only and may not be sold, redistributed or otherwise used for commercial purposes  All illustrations and images included in CareNotes® are the copyrighted property of [employer]  or Travis Mcpherson  The above information is an  only  It is not intended as medical advice for individual conditions or treatments  Talk to your doctor, nurse or pharmacist before following any medical regimen to see if it is safe and effective for you

## 2019-01-07 DIAGNOSIS — J01.00 ACUTE NON-RECURRENT MAXILLARY SINUSITIS: ICD-10-CM

## 2019-01-07 RX ORDER — DOXYCYCLINE HYCLATE 100 MG/1
100 CAPSULE ORAL EVERY 12 HOURS SCHEDULED
Qty: 60 CAPSULE | Refills: 0 | OUTPATIENT
Start: 2019-01-07 | End: 2019-01-17

## 2019-01-07 NOTE — TELEPHONE ENCOUNTER
Pharmacy is requesting 61? Since she was just here on the 4th, she should not even be out of the Rx she has?

## 2019-02-25 ENCOUNTER — OFFICE VISIT (OUTPATIENT)
Dept: FAMILY MEDICINE CLINIC | Facility: CLINIC | Age: 54
End: 2019-02-25
Payer: COMMERCIAL

## 2019-02-25 VITALS
TEMPERATURE: 97.9 F | WEIGHT: 203 LBS | HEART RATE: 66 BPM | SYSTOLIC BLOOD PRESSURE: 104 MMHG | HEIGHT: 67 IN | OXYGEN SATURATION: 96 % | DIASTOLIC BLOOD PRESSURE: 62 MMHG | BODY MASS INDEX: 31.86 KG/M2

## 2019-02-25 DIAGNOSIS — H35.371 MACULAR PUCKER, RIGHT EYE: ICD-10-CM

## 2019-02-25 DIAGNOSIS — Z01.818 PRE-OP EXAMINATION: Primary | ICD-10-CM

## 2019-02-25 PROCEDURE — 99214 OFFICE O/P EST MOD 30 MIN: CPT | Performed by: FAMILY MEDICINE

## 2019-02-25 PROCEDURE — 3008F BODY MASS INDEX DOCD: CPT | Performed by: FAMILY MEDICINE

## 2019-02-25 NOTE — PROGRESS NOTES
[facility] PRE-OPERATIVE EVALUATION  [facility]    NAME: Jennifer Bettencourt  AGE: 47 y o  SEX: female  : 1965     DATE: 2019    [facility] Pre-Operative Evaluation      Chief Complaint: Pre-operative Evaluation     Surgery: vitrectomy right eye  Anticipated Date of Surgery: 2019  Referring Provider: Self, Referral       History of Present Illness:     Micky Santa is a 47 y o  female who presents to the office today for a preoperative consultation at the request of surgeon, Dr Jay Mora, who plans on performing vitrectomy right eye on 2019  Planned anesthesia is local and IV sedation  Patient has a bleeding risk of: no recent abnormal bleeding, no remote history of abnormal bleeding and no use of Ca-channel blockers  Patient does not have objections to receiving blood products if needed  Current anti-platelet/anti-coagulation medications that the patient is prescribed includes: none  Assessment of Chronic Conditions:   - hypothyroid- controlled, depression- controlled     Assessment of Cardiac Risk:  · Denies unstable or severe angina or MI in the last 6 weeks or history of stent placement in the last year   · Denies decompensated heart failure (e g  New onset heart failure, NYHA functional class IV heart failure, or worsening existing heart failure)  · Denies significant arrhythmias such as high grade AV block, symptomatic ventricular arrhythmia, newly recognized ventricular tachycardia, supraventricular tachycardia with resting heart rate >100, or symptomatic bradycardia  · Denies severe heart valve disease including aortic stenosis or symptomatic mitral stenosis     Exercise Capacity:  · Able to walk 4 blocks without symptoms?: Yes  · Able to walk 2 flights without symptoms?: Yes    Prior Anesthesia Reactions: No     Personal history of venous thromboembolic disease? No    History of steroid use for >2 weeks within last year?  No         Review of Systems:     Review of Systems   Constitutional: Negative  Negative for fatigue and fever  HENT: Negative  Eyes: Negative  Respiratory: Negative  Negative for cough  Cardiovascular: Negative  Gastrointestinal: Negative  Endocrine: Negative  Genitourinary: Negative  Musculoskeletal: Negative  Skin: Negative  Allergic/Immunologic: Negative  Neurological: Negative  Psychiatric/Behavioral: Negative  Current Problem List:     Patient Active Problem List   Diagnosis    Depression    Hypothyroid    Screening for colon cancer    Fatigue    Macular pucker, right eye    Pre-op examination       Allergies: Allergies   Allergen Reactions    Sulfa Antibiotics Throat Swelling       Current Medications:       Current Outpatient Medications:     levothyroxine 75 mcg tablet, TAKE 1 TABLET BY MOUTH EVERY DAY, Disp: 90 tablet, Rfl: 2    sertraline (ZOLOFT) 100 mg tablet, Take 1 5 tablets (150 mg total) by mouth daily, Disp: 135 tablet, Rfl: 0    Past Medical History:       Past Medical History:   Diagnosis Date    Disease of thyroid gland         Past Surgical History:   Procedure Laterality Date    ADENOIDECTOMY      Last assessed: 1/15/18    LYMPHADENECTOMY      Neck, Last assessed: 1/15/18    TX COLONOSCOPY FLX DX W/COLLJ SPEC WHEN PFRMD N/A 5/21/2018    Procedure: COLONOSCOPY;  Surgeon: Shruthi De La O MD;  Location[facility];   Service: Gastroenterology        Family History   Problem Relation Age of Onset    Osteoporosis Mother     Thyroid disease Mother     Restless legs syndrome Mother     Prostate cancer Father     Thyroid disease Brother     Alzheimer's disease Paternal Grandmother     Schizophrenia Other         Social History     Socioeconomic History    Marital status: Single     Spouse name: Not on file    Number of children: Not on file    Years of education: Not on file    Highest education level: Not on file   Occupational History    Not on file   Social Needs    Financial resource strain: Not on file    Food insecurity:     Worry: Not on file     Inability: Not on file    Transportation needs:     Medical: Not on file     Non-medical: Not on file   Tobacco Use    Smoking status: Former Smoker     Years: 5 00    Smokeless tobacco: Never Used   Substance and Sexual Activity    Alcohol use: Yes     Comment: 1-4 drinks/ week, per allscripts    Drug use: Not on file    Sexual activity: Not on file   Lifestyle    Physical activity:     Days per week: Not on file     Minutes per session: Not on file    Stress: Not on file   Relationships    Social connections:     Talks on phone: Not on file     Gets together: Not on file     Attends Taoism service: Not on file     Active member of club or organization: Not on file     Attends meetings of clubs or organizations: Not on file     Relationship status: Not on file    Intimate partner violence:     Fear of current or ex partner: Not on file     Emotionally abused: Not on file     Physically abused: Not on file     Forced sexual activity: Not on file   Other Topics Concern    Not on file   Social History Narrative    Daily caffeine consumption, 1 serving a day    Exercises daily        Physical Exam:     /62   Pulse 66   Temp 97 9 °F (36 6 °C)   Ht 5' 7" (1 702 m)   Wt 92 1 kg (203 lb)   SpO2 96%   BMI 31 79 kg/m²     Physical Exam   Constitutional: She is oriented to person, place, and time  She appears well-developed and well-nourished  HENT:   Head: Normocephalic and atraumatic  Right Ear: External ear normal    Left Ear: External ear normal    Nose: Nose normal    Mouth/Throat: Oropharynx is clear and moist    Eyes: Pupils are equal, round, and reactive to light  Conjunctivae and EOM are normal    Neck: Normal range of motion  Neck supple  Cardiovascular: Normal rate, regular rhythm, normal heart sounds and intact distal pulses     Pulmonary/Chest: Effort normal and breath sounds normal    Abdominal: Soft  Bowel sounds are normal    Musculoskeletal: Normal range of motion  Neurological: She is alert and oriented to person, place, and time  Skin: Skin is warm and dry  Psychiatric: She has a normal mood and affect  Her behavior is normal  Judgment and thought content normal    Nursing note and vitals reviewed  Data:     Pre-operative work-up    Laboratory Results: not indicated     EKG: not indicated    Chest x-ray: not indicated      Previous cardiopulmonary studies within the past year:  · Echocardiogram:   · Cardiac Catheterization:   · Stress Test:  · Pulmonary Function Testing:      Assessment & Recommendations:     1  Pre-op examination     2  Macular pucker, right eye         Pre-Op Evaluation Assessment  47 y o  female with planned surgery: vitrectomy right eye  Known risk factors for perioperative complications: None    Luis Valderrama BMI Counseling: Body mass index is 31 79 kg/m²  The BMI is above normal  Nutrition recommendations include reducing portion sizes and 3-5 servings of fruits/vegetables daily  Exercise recommendations include exercising 3-5 times per week  Current medications which may produce withdrawal symptoms if withheld perioperatively: none    Pre-Op Evaluation Plan  1  Further preoperative workup as follows:   - None; no further preoperative work-up is required    2  Medication Management/Recommendations:   - None, continue medication regimen including morning of surgery, with sip of water  - Patient has been instructed to avoid herbs or non-directed vitamins the week prior to surgery to ensure no drug interactions with perioperative surgical and anesthetic medications  3  Prophylaxis for cardiac events with perioperative beta-blockers: not indicated  4  Patient requires further consultation with: None    Clearance  Patient is CLEARED for surgery without any additional cardiac testing       Ector Narayan, [address] [place] [address] [address]  Phone#  [phone]  Fax#  [phone]

## 2019-05-16 DIAGNOSIS — F32.0 MILD MAJOR DEPRESSION (HCC): ICD-10-CM

## 2019-05-16 RX ORDER — SERTRALINE HYDROCHLORIDE 100 MG/1
150 TABLET, FILM COATED ORAL DAILY
Qty: 135 TABLET | Refills: 0 | Status: CANCELLED | OUTPATIENT
Start: 2019-05-16

## 2019-05-17 DIAGNOSIS — F32.0 MILD MAJOR DEPRESSION (HCC): ICD-10-CM

## 2019-05-17 RX ORDER — SERTRALINE HYDROCHLORIDE 100 MG/1
150 TABLET, FILM COATED ORAL DAILY
Qty: 135 TABLET | Refills: 0 | Status: SHIPPED | OUTPATIENT
Start: 2019-05-17 | End: 2019-08-15 | Stop reason: SDUPTHER

## 2019-08-15 DIAGNOSIS — F32.0 MILD MAJOR DEPRESSION (HCC): ICD-10-CM

## 2019-08-15 RX ORDER — SERTRALINE HYDROCHLORIDE 100 MG/1
150 TABLET, FILM COATED ORAL DAILY
Qty: 135 TABLET | Refills: 0 | Status: SHIPPED | OUTPATIENT
Start: 2019-08-15